# Patient Record
Sex: OTHER/UNKNOWN | URBAN - METROPOLITAN AREA
[De-identification: names, ages, dates, MRNs, and addresses within clinical notes are randomized per-mention and may not be internally consistent; named-entity substitution may affect disease eponyms.]

---

## 2019-01-03 ENCOUNTER — APPOINTMENT (OUTPATIENT)
Age: 84
Setting detail: DERMATOLOGY
End: 2019-01-08

## 2019-01-03 VITALS
HEIGHT: 70 IN | DIASTOLIC BLOOD PRESSURE: 81 MMHG | HEART RATE: 66 BPM | WEIGHT: 175 LBS | SYSTOLIC BLOOD PRESSURE: 134 MMHG

## 2019-01-03 DIAGNOSIS — D485 NEOPLASM OF UNCERTAIN BEHAVIOR OF SKIN: ICD-10-CM

## 2019-01-03 PROBLEM — D48.5 NEOPLASM OF UNCERTAIN BEHAVIOR OF SKIN: Status: ACTIVE | Noted: 2019-01-03

## 2019-01-03 PROBLEM — L23.7 ALLERGIC CONTACT DERMATITIS DUE TO PLANTS, EXCEPT FOOD: Status: ACTIVE | Noted: 2019-01-03

## 2019-01-03 PROBLEM — E03.9 HYPOTHYROIDISM, UNSPECIFIED: Status: ACTIVE | Noted: 2019-01-03

## 2019-01-03 PROBLEM — C44.91 BASAL CELL CARCINOMA OF SKIN, UNSPECIFIED: Status: ACTIVE | Noted: 2019-01-03

## 2019-01-03 PROBLEM — Z85.828 PERSONAL HISTORY OF OTHER MALIGNANT NEOPLASM OF SKIN: Status: ACTIVE | Noted: 2019-01-03

## 2019-01-03 PROCEDURE — OTHER COUNSELING: OTHER

## 2019-01-03 PROCEDURE — OTHER MIPS QUALITY: OTHER

## 2019-01-03 PROCEDURE — OTHER BIOPSY BY SHAVE METHOD: OTHER

## 2019-01-03 PROCEDURE — 11102 TANGNTL BX SKIN SINGLE LES: CPT

## 2019-01-03 PROCEDURE — 99202 OFFICE O/P NEW SF 15 MIN: CPT | Mod: 25

## 2019-01-03 ASSESSMENT — LOCATION DETAILED DESCRIPTION DERM: LOCATION DETAILED: LEFT CLAVICULAR NECK

## 2019-01-03 ASSESSMENT — LOCATION ZONE DERM: LOCATION ZONE: NECK

## 2019-01-03 ASSESSMENT — LOCATION SIMPLE DESCRIPTION DERM: LOCATION SIMPLE: LEFT ANTERIOR NECK

## 2019-01-03 NOTE — PROCEDURE: BIOPSY BY SHAVE METHOD
Type Of Destruction Used: Curettage
Silver Nitrate Text: The wound bed was treated with silver nitrate after the biopsy was performed.
Anesthesia Type: 1% lidocaine with epinephrine
Cryotherapy Text: The wound bed was treated with cryotherapy after the biopsy was performed.
Biopsy Type: H and E
Render Post-Care Instructions In Note?: no
Size Of Lesion In Cm: 0.4
Electrodesiccation Text: The wound bed was treated with electrodesiccation after the biopsy was performed.
Consent: Written consent was obtained and risks were reviewed including but not limited to scarring, infection, bleeding, scabbing, incomplete removal, nerve damage and allergy to anesthesia.
Electrodesiccation And Curettage Text: The wound bed was treated with electrodesiccation and curettage after the biopsy was performed.
Depth Of Biopsy: dermis
Dressing: bandage
Notification Instructions: Patient will be notified of biopsy results. However, patient instructed to call the office if not contacted within 2 weeks.
Hemostasis: Drysol
Post-Care Instructions: I reviewed with the patient in detail post-care instructions. Patient is to keep the biopsy site dry overnight, and then apply bacitracin twice daily until healed. Patient may apply hydrogen peroxide soaks to remove any crusting.
Billing Type: Third-Party Bill
X Size Of Lesion In Cm: 0.5
Biopsy Method: Dermablade
Wound Care: Petrolatum
Curettage Text: The wound bed was treated with curettage after the biopsy was performed.
Additional Anesthesia Volume In Cc (Will Not Render If 0): 0
Was A Bandage Applied: Yes
Detail Level: Detailed

## 2019-01-03 NOTE — PROCEDURE: COUNSELING
Detail Level: Detailed
Patient Specific Counseling (Will Not Stick From Patient To Patient): Recommend Mohs surgery and full skin exam

## 2019-01-03 NOTE — PROCEDURE: MIPS QUALITY
Name And Contact Information For Health Care Proxy: Yudith Gonzalez [phone] Name And Contact Information For Health Care Proxy: Yudith Gonzalez [phone]

## 2019-01-10 ENCOUNTER — APPOINTMENT (OUTPATIENT)
Age: 84
Setting detail: DERMATOLOGY
End: 2019-01-10

## 2019-01-10 PROBLEM — C44.41 BASAL CELL CARCINOMA OF SKIN OF SCALP AND NECK: Status: ACTIVE | Noted: 2019-01-10

## 2019-01-10 PROCEDURE — OTHER REFERRAL: OTHER

## 2019-01-14 ENCOUNTER — APPOINTMENT (OUTPATIENT)
Age: 84
Setting detail: DERMATOLOGY
End: 2019-01-15

## 2019-01-14 VITALS
HEIGHT: 70.5 IN | HEART RATE: 71 BPM | DIASTOLIC BLOOD PRESSURE: 77 MMHG | SYSTOLIC BLOOD PRESSURE: 122 MMHG | WEIGHT: 180 LBS

## 2019-01-14 PROBLEM — C44.41 BASAL CELL CARCINOMA OF SKIN OF SCALP AND NECK: Status: ACTIVE | Noted: 2019-01-14

## 2019-01-14 PROCEDURE — OTHER MOHS SURGERY: OTHER

## 2019-01-14 PROCEDURE — 14041 TIS TRNFR F/C/C/M/N/A/G/H/F: CPT

## 2019-01-14 PROCEDURE — OTHER MIPS QUALITY: OTHER

## 2019-01-14 PROCEDURE — OTHER DISCHARGE ORDERS: OTHER

## 2019-01-14 PROCEDURE — 17311 MOHS 1 STAGE H/N/HF/G: CPT

## 2019-01-14 NOTE — PROCEDURE: MIPS QUALITY
Patient Education     What Is Cubital Tunnel Syndrome? Cubital tunnel syndrome is a set of symptoms that may occur if the ulnar nerve in your elbow gets pinched. This may happen if you bend or lean on your elbows often. Your cubital tunnel  The cubital tunnel is a groove in a bone near your elbow. This narrow groove provides a passage for the ulnar nerve, one of the main nerves in your arm. The ulnar nerve can cause âfunny boneâ pain if your elbow gets bumped. Your cubital tunnel helps protect this nerve as it passes through your elbow and down to your fingers. Compressing the ulnar nerve  Bending your elbow compresses the ulnar nerve inside the cubital tunnel. The nerve can get inflamed (irritated) after constant bending and pinching or after getting hurt. Over time, this can lead to pain or numbness. The pain is often felt in your ring fingers and little fingers. Bending your elbow as you hold a phone can cause problems over time. What are its symptoms? Â· Numbness or tingling in the ring fingers and little fingers  Â· Loss of finger or hand strength  Â· Inability to straighten fingers  Â· Sharp, sudden pain when elbow is touched  The road to healing  You can keep cubital tunnel syndrome from flaring up. Avoid pinching the ulnar nerve by keeping your arm straight as much as you can, even while sleeping. And use phone headsets and elbow pads. If you still have pain, tell your doctor. Â© [facility], [place], [address] All rights reserved. This information is not intended as a substitute for professional medical care. Always follow your healthcare professional's instructions. Patient Education     What is Carpal Tunnel Syndrome (CTS)? Carpal tunnel syndrome (CTS) is a problem that affects the wrist and hand. If you have CTS, tingling and numbness can make even simple tasks hard to do.  But CTS can be treated, and your symptoms can be controlled. Learning about carpal tunnel  The carpal tunnel is a narrow space inside the wrist that is surrounded by bone and ligament. This space lets certain tendons and a major nerve pass from the forearm into the hand. With CTS, the tendon sheaths may thicken and enlarge. This reduces the amount of space inside the carpal tunnel. As a result, the median nerve may be compressed. The symptoms of CTS  Tingling and numbness are the most common symptoms of CTS. Some people also have hand pain or even a weakened . At first, symptoms may wake you up at night. Later, they may also occur during your daily routines. For instance, you may notice symptoms while you are driving or holding a newspaper. Your symptoms may become more severe over time. Symptoms of CTS may keep you up at night. Working with your doctor  Your doctor will perform an exam to learn more about your symptoms. Once your problem is diagnosed, you and your doctor can make a treatment plan. He or she may recommendÂ wristÂ braces or splints,Â local corticosteroid injections, and/or surgery, depending on your history and the severity of your physical exam findings. If you have surgery, you are likely to go home the same day. Â© [facility] [facility], [place], [address] [facility]. All rights reserved. This information is not intended as a substitute for professional medical care. Always follow your healthcare professional's instructions. Quality 47: Advance Care Plan: Advance Care Planning discussed and documented; advance care plan or surrogate decision maker documented in the medical record.

## 2019-01-14 NOTE — PROCEDURE: MIPS QUALITY
Name And Contact Information For Health Care Proxy: Yudith Gonzalez [phone] Name And Contact Information For Health Care Proxy: Yudith Gonzalez [phone]

## 2020-08-12 ENCOUNTER — APPOINTMENT (OUTPATIENT)
Age: 85
Setting detail: DERMATOLOGY
End: 2020-08-12

## 2020-08-12 VITALS
SYSTOLIC BLOOD PRESSURE: 119 MMHG | HEIGHT: 70 IN | DIASTOLIC BLOOD PRESSURE: 71 MMHG | HEART RATE: 80 BPM | WEIGHT: 180 LBS

## 2020-08-12 DIAGNOSIS — L57.0 ACTINIC KERATOSIS: ICD-10-CM

## 2020-08-12 DIAGNOSIS — L82.1 OTHER SEBORRHEIC KERATOSIS: ICD-10-CM

## 2020-08-12 DIAGNOSIS — Z85.828 PERSONAL HISTORY OF OTHER MALIGNANT NEOPLASM OF SKIN: ICD-10-CM

## 2020-08-12 DIAGNOSIS — L90.5 SCAR CONDITIONS AND FIBROSIS OF SKIN: ICD-10-CM

## 2020-08-12 PROBLEM — I10 ESSENTIAL (PRIMARY) HYPERTENSION: Status: ACTIVE | Noted: 2020-08-12

## 2020-08-12 PROCEDURE — 17000 DESTRUCT PREMALG LESION: CPT

## 2020-08-12 PROCEDURE — 17003 DESTRUCT PREMALG LES 2-14: CPT

## 2020-08-12 PROCEDURE — OTHER LIQUID NITROGEN: OTHER

## 2020-08-12 PROCEDURE — OTHER MIPS QUALITY: OTHER

## 2020-08-12 PROCEDURE — OTHER COUNSELING: OTHER

## 2020-08-12 PROCEDURE — 99214 OFFICE O/P EST MOD 30 MIN: CPT | Mod: 25

## 2020-08-12 ASSESSMENT — LOCATION SIMPLE DESCRIPTION DERM
LOCATION SIMPLE: LEFT FOREARM
LOCATION SIMPLE: SCALP
LOCATION SIMPLE: LEFT ANTERIOR NECK

## 2020-08-12 ASSESSMENT — LOCATION ZONE DERM
LOCATION ZONE: ARM
LOCATION ZONE: SCALP
LOCATION ZONE: NECK

## 2020-08-12 ASSESSMENT — LOCATION DETAILED DESCRIPTION DERM
LOCATION DETAILED: LEFT VENTRAL PROXIMAL FOREARM
LOCATION DETAILED: LEFT SUPERIOR PARIETAL SCALP
LOCATION DETAILED: LEFT VENTRAL DISTAL FOREARM
LOCATION DETAILED: LEFT CLAVICULAR NECK
LOCATION DETAILED: RIGHT SUPERIOR PARIETAL SCALP

## 2020-08-12 NOTE — PROCEDURE: MIPS QUALITY
Quality 47: Advance Care Plan: Advance Care Planning discussed and documented; advance care plan or surrogate decision maker documented in the medical record.
Detail Level: Detailed
Quality 226: Preventive Care And Screening: Tobacco Use: Screening And Cessation Intervention: Patient screened for tobacco use and is an ex/non-smoker

## 2020-08-12 NOTE — PROCEDURE: LIQUID NITROGEN
Post-Care Instructions: I reviewed with the patient in detail post-care instructions. Patient is to wear sunprotection, and avoid picking at any of the treated lesions. Pt may apply Vaseline to crusted or scabbing areas.
Render Post-Care Instructions In Note?: no
Detail Level: Detailed
Duration Of Freeze Thaw-Cycle (Seconds): 6
Consent: The patient's consent was obtained including but not limited to risks of crusting, scabbing, blistering, scarring, darker or lighter pigmentary change, recurrence, incomplete removal and infection.
Number Of Freeze-Thaw Cycles: 2 freeze-thaw cycles